# Patient Record
Sex: MALE | HISPANIC OR LATINO | Employment: FULL TIME | URBAN - METROPOLITAN AREA
[De-identification: names, ages, dates, MRNs, and addresses within clinical notes are randomized per-mention and may not be internally consistent; named-entity substitution may affect disease eponyms.]

---

## 2022-09-09 ENCOUNTER — HOSPITAL ENCOUNTER (EMERGENCY)
Facility: CLINIC | Age: 27
Discharge: HOME | End: 2022-09-09
Attending: EMERGENCY MEDICINE | Admitting: EMERGENCY MEDICINE

## 2022-09-09 ENCOUNTER — APPOINTMENT (OUTPATIENT)
Dept: RADIOLOGY | Facility: CLINIC | Age: 27
End: 2022-09-09
Attending: EMERGENCY MEDICINE

## 2022-09-09 VITALS
OXYGEN SATURATION: 97 % | HEIGHT: 71 IN | TEMPERATURE: 98.6 F | RESPIRATION RATE: 16 BRPM | HEART RATE: 72 BPM | WEIGHT: 205 LBS | SYSTOLIC BLOOD PRESSURE: 129 MMHG | BODY MASS INDEX: 28.7 KG/M2 | DIASTOLIC BLOOD PRESSURE: 88 MMHG
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
## 2022-09-09 DIAGNOSIS — S90.01XA CONTUSION OF RIGHT ANKLE, INITIAL ENCOUNTER: ICD-10-CM

## 2022-09-09 DIAGNOSIS — M79.661 PAIN OF RIGHT LOWER LEG: ICD-10-CM

## 2022-09-09 PROCEDURE — 73610 X-RAY EXAM OF ANKLE: CPT | Mod: RT

## 2022-09-09 PROCEDURE — 73630 X-RAY EXAM OF FOOT: CPT | Mod: RT

## 2022-09-09 PROCEDURE — 73590 X-RAY EXAM OF LOWER LEG: CPT | Mod: RT

## 2022-09-09 PROCEDURE — 99285 EMERGENCY DEPT VISIT HI MDM: CPT

## 2022-09-09 RX ORDER — OXYCODONE HYDROCHLORIDE 5 MG/1
TABLET ORAL
Qty: 8 TABLET | Refills: 0 | Status: SHIPPED | OUTPATIENT
Start: 2022-09-09

## 2022-09-09 RX ORDER — ACETAMINOPHEN 325 MG/1
650 TABLET ORAL EVERY 6 HOURS PRN
Qty: 60 TABLET | Refills: 0 | Status: SHIPPED | OUTPATIENT
Start: 2022-09-09

## 2022-09-09 RX ORDER — OXYCODONE HYDROCHLORIDE 5 MG/1
10 TABLET ORAL ONCE
Status: DISCONTINUED | OUTPATIENT
Start: 2022-09-09 | End: 2022-09-10 | Stop reason: HOSPADM

## 2022-09-09 RX ORDER — ACETAMINOPHEN 325 MG/1
975 TABLET ORAL ONCE
Status: DISCONTINUED | OUTPATIENT
Start: 2022-09-09 | End: 2022-09-10 | Stop reason: HOSPADM

## 2022-09-09 ASSESSMENT — ENCOUNTER SYMPTOMS: WOUND: 0

## 2022-09-09 ASSESSMENT — ACTIVITIES OF DAILY LIVING (ADL): ADLS_ACUITY_SCORE: 33

## 2022-09-10 NOTE — ED PROVIDER NOTES
EMERGENCY DEPARTMENT ENCOUNTER      NAME: Romeo Yanez  AGE: 27 year old male  YOB: 1995  MRN: [id number]  EVALUATION DATE & TIME: No admission date for patient encounter.    PCP: No primary care provider on file.    ED PROVIDER: Sandra Bonilla M.D.        Chief Complaint   Patient presents with     Ankle Pain         FINAL IMPRESSION:    1. Contusion of right ankle, initial encounter    2. Pain of right lower leg    3. Work related injury            MEDICAL DECISION [employer] year old male otherwise healthy presents emergency department with right leg pain after his ankle and distal tib-fib were caught between 2 pallets at work.  He complains of right lateral malleolus tenderness and pain as well as right medial mid and distal tibia pain.  No lacerations.  Overall looking at the leg there is not significant swelling.  No open wounds.  He has tenderness to the right lateral malleolus and right mid to distal tibia though no obvious fractures.  On examination though he does have some foot tenderness, especially along the arch of the foot.  X-rays ordered no signs for foot fracture. Oxycodone Tylenol offered to patient in the ER but he declines.  Nothing on exam to suggest compartment syndrome for the cause of his pain.     Patient provided with crutches for symptomatic support and prescriptions.  We will have him follow-up with      ED COURSE:  9:24 PM  I met with the patient to gather history and perform my exam. ED course and treatment discussed.    9:54 PM  Nursing offered the patient oxycodone and Tylenol as ordered.  Patient declines at this time.  He will go for his foot imaging.    10:53 PM  Patient updated on results.  No obvious fracture seen on imaging.  Certainly he will have pain and contusions to this area.  We will do crutches for symptomatic support.  Prescriptions for pain control be provided.  His boss is present at bedside and will help him arrange for follow-up with  their occupational health clinic.  Otherwise at this time neurovascular tact distally.  Nothing to suggest compartment syndrome.  No obvious fracture.    Using this photo as a guide nothing at this time to support compartment syndrome.  He has some focal tenderness of the mid and distal tibia, but nothing that would support the superficial or deep compartments.  He has no tenderness more proximally along his compartments.  Same is true laterally along the malleolus.  Nothing to suggest anterior or lateral compartment syndrome.  Again he is very soft proximally, very focally tender at the site of injury.          COVID-19 PPE worn during patient evaluation:  Mask: n95 and homemade masks   Eye Protection: goggles   Gown: none   Hair cover: yes  Face shield: none   Patient wearing a mask: yes     At the conclusion of the encounter I discussed the results of all of the tests and the disposition. Their questions were answered. The patient (and any family present) acknowledged understanding and were agreeable with the care plan.        CONSULTANTS:  none        MEDICATIONS GIVEN IN THE EMERGENCY:  Medications   oxyCODONE (ROXICODONE) tablet 10 mg (10 mg Oral Not Given 9/9/22 2150)   acetaminophen (TYLENOL) tablet 975 mg (975 mg Oral Not Given 9/9/22 2149)           NEW PRESCRIPTIONS STARTED AT TODAY'S ER VISIT     Medication List      Started    acetaminophen 325 MG tablet  Commonly known as: TYLENOL  650 mg, Oral, EVERY 6 HOURS PRN     oxyCODONE 5 MG tablet  Commonly known as: ROXICODONE  1-2 tabs PO q6hr PRN pain. Do not drive.  Do not mix wih alcohol.              CONDITION:  stable      DISPOSITION:  D.c home with his boss         =================================================================  =================================================================    HPI    Patient information was obtained from: patient    Use of Intrepreter: N/A     Romeo Yanez is a 27 year old male who is otherwise healthy and who  "presents to the ER with complaints of right ankle and lower leg pain.  He was at work today when his leg got caught between 2 pallets.  Complains of pain mainly to the right lateral ankle and mid to distal medial tibia.    Denies any other injuries.  Has not taken anything for pain.      REVIEW OF SYSTEMS  Review of Systems   Musculoskeletal:        +right ankle and distal tib/fib pain   Skin: Negative for wound.           PAST MEDICAL HISTORY:  History reviewed. No pertinent past medical history.      PAST SURGICAL HISTORY:  History reviewed. No pertinent surgical history.      CURRENT MEDICATIONS:    Prior to Admission medications    Not on File         ALLERGIES:  No Known Allergies      FAMILY HISTORY:  History reviewed. No pertinent family history.      SOCIAL HISTORY:         VITALS:  Patient Vitals for the past 24 hrs:   BP Temp Temp src Pulse Resp SpO2 Height Weight   09/09/22 2035 129/88 98.6  F (37  C) Oral 72 16 97 % 1.803 m (5' 11\") 93 kg (205 lb)       Wt Readings from Last 3 Encounters:   09/09/22 93 kg (205 lb)       CrCl cannot be calculated (No successful lab value found.).    PHYSICAL EXAM    Constitutional:  Well developed, Well nourished, NAD, GCS 15  HENT:  Normocephalic, Atraumatic, Bilateral external ears normal, Nose normal. Neck- Supple, No stridor.   Eyes:  PERRL, EOMI, Conjunctiva normal, No discharge.  Respiratory: No respiratory distress, Speaks full sentences easily. No cough.   Cardiovascular:  Normal heart rate  GI:  No excessive obesity.    : deferred  Musculoskeletal: 2+ DP pulses. No edema. No cyanosis, No clubbing. No major deformities noted. +tenderness to right lateral malleolus and right mid to distal tibia medially.  No lacerations.  She also has some tenderness to the arch of the right foot.  Integument:  Warm, Dry, No erythema, No rash.  No petechiae.  Neurologic:  Alert & oriented x 3, Normal motor function, Normal sensory function, No focal deficits noted.   Psychiatric: "  Affect normal, Cooperative         LAB:  All pertinent labs reviewed and interpreted.  No results found for this or any previous visit (from the past 24 hour(s)).    No results found for: ABORH        RADIOLOGY:  Reviewed all pertinent imaging. Please see official radiology report.    Foot  XR, G/E 3 views, right   Final Result   IMPRESSION: Normal joint spaces and alignment. No fracture.      Ankle XR, G/E 3 views, right   Final Result   IMPRESSION: No fracture. Ankle mortise is intact. Mild soft tissue swelling along the lateral aspect of the ankle.      XR Tibia and Fibula Right 2 Views   Final Result   IMPRESSION: No fracture. Ankle mortise is intact. Mild soft tissue swelling along the lateral aspect of the ankle.            EKG:    none    PROCEDURES:  none      Sandra Bonilla M.D. [facility]  Emergency Medicine and Medical Toxicology  [facility] EMERGENCY ROOM  [address] [id number] [phone]  Dept: [phone]           Sandra Bonilla MD  09/09/22 0281

## 2022-09-10 NOTE — DISCHARGE INSTRUCTIONS
No signs for fracture seen on the x-rays.  Certainly you will have pain from bruising to this area in general.  This pain will likely last several days but you should notice improvement within the week.    Use the crutches if needed for the next few days.  If you are not able to wean yourself off the crutches by Monday/Tuesday then I would recommend following up with [facility].    Take the pain medication only if needed for severe pain.  Otherwise I recommend using Tylenol or ibuprofen for pain.  Ice the area a few times a day for 20 minutes at a time.  Elevate the leg when possible to help with swelling.    Return to emergency department with worse pain, cold or numb toes, white or blue toes, or any other concerns.    Thank you for choosing [facility] Emergency Department.  It has been my pleasure caring for you today.     ~Dr. Chad MD

## 2022-09-10 NOTE — ED TRIAGE NOTES
Pt reports he was at work when his R leg got caught between two heavy pallets. One made contact to lateral aspect of ankle, the other to medial aspect of lower leg. Severe pain with weightbearing.     Triage Assessment     Row Name 09/09/22 2036       Triage Assessment (Adult)    Airway WDL WDL       Respiratory WDL    Respiratory WDL WDL       Skin Circulation/Temperature WDL    Skin Circulation/Temperature WDL WDL       Cardiac WDL    Cardiac WDL WDL       Peripheral/Neurovascular WDL    Peripheral Neurovascular WDL WDL       Cognitive/Neuro/Behavioral WDL    Cognitive/Neuro/Behavioral WDL WDL